# Patient Record
Sex: FEMALE | Race: WHITE | NOT HISPANIC OR LATINO | Employment: OTHER | URBAN - NONMETROPOLITAN AREA
[De-identification: names, ages, dates, MRNs, and addresses within clinical notes are randomized per-mention and may not be internally consistent; named-entity substitution may affect disease eponyms.]

---

## 2017-09-07 ENCOUNTER — TRANSCRIBE ORDERS (OUTPATIENT)
Dept: ADMINISTRATIVE | Facility: HOSPITAL | Age: 67
End: 2017-09-07

## 2017-09-07 DIAGNOSIS — Z13.820 SCREENING FOR OSTEOPOROSIS: Primary | ICD-10-CM

## 2017-09-07 DIAGNOSIS — Z12.31 VISIT FOR SCREENING MAMMOGRAM: ICD-10-CM

## 2017-09-25 ENCOUNTER — HOSPITAL ENCOUNTER (OUTPATIENT)
Dept: MAMMOGRAPHY | Facility: HOSPITAL | Age: 67
Discharge: HOME | End: 2017-09-25
Admitting: NURSE PRACTITIONER

## 2017-09-25 ENCOUNTER — HOSPITAL ENCOUNTER (OUTPATIENT)
Dept: BONE DENSITY | Facility: HOSPITAL | Age: 67
Discharge: HOME | End: 2017-09-25

## 2017-09-25 DIAGNOSIS — Z13.820 SCREENING FOR OSTEOPOROSIS: ICD-10-CM

## 2017-09-25 DIAGNOSIS — Z12.31 VISIT FOR SCREENING MAMMOGRAM: ICD-10-CM

## 2017-09-25 PROCEDURE — 77080 DXA BONE DENSITY AXIAL: CPT | Performed by: RADIOLOGY

## 2017-09-25 PROCEDURE — G0202 SCR MAMMO BI INCL CAD: HCPCS

## 2017-09-25 PROCEDURE — 77080 DXA BONE DENSITY AXIAL: CPT

## 2017-09-25 PROCEDURE — 77063 BREAST TOMOSYNTHESIS BI: CPT

## 2017-09-25 PROCEDURE — 77063 BREAST TOMOSYNTHESIS BI: CPT | Performed by: RADIOLOGY

## 2017-09-25 PROCEDURE — G0202 SCR MAMMO BI INCL CAD: HCPCS | Performed by: RADIOLOGY

## 2018-10-05 ENCOUNTER — HOSPITAL ENCOUNTER (OUTPATIENT)
Dept: MAMMOGRAPHY | Facility: HOSPITAL | Age: 68
Discharge: HOME | End: 2018-10-05
Admitting: FAMILY MEDICINE

## 2018-10-05 DIAGNOSIS — Z12.39 SCREENING BREAST EXAMINATION: ICD-10-CM

## 2018-10-05 PROCEDURE — 77067 SCR MAMMO BI INCL CAD: CPT | Performed by: RADIOLOGY

## 2018-10-05 PROCEDURE — 77067 SCR MAMMO BI INCL CAD: CPT

## 2018-10-05 PROCEDURE — 77063 BREAST TOMOSYNTHESIS BI: CPT | Performed by: RADIOLOGY

## 2018-10-05 PROCEDURE — 77063 BREAST TOMOSYNTHESIS BI: CPT

## 2019-10-08 ENCOUNTER — HOSPITAL ENCOUNTER (OUTPATIENT)
Dept: MAMMOGRAPHY | Facility: HOSPITAL | Age: 69
Discharge: HOME | End: 2019-10-08
Admitting: CLINIC/CENTER

## 2019-10-08 DIAGNOSIS — Z12.31 VISIT FOR SCREENING MAMMOGRAM: ICD-10-CM

## 2019-10-08 PROCEDURE — 77063 BREAST TOMOSYNTHESIS BI: CPT

## 2019-10-08 PROCEDURE — 77067 SCR MAMMO BI INCL CAD: CPT | Performed by: RADIOLOGY

## 2019-10-08 PROCEDURE — 77067 SCR MAMMO BI INCL CAD: CPT

## 2019-10-08 PROCEDURE — 77063 BREAST TOMOSYNTHESIS BI: CPT | Performed by: RADIOLOGY

## 2020-10-22 ENCOUNTER — OFFICE VISIT (OUTPATIENT)
Dept: CARDIOLOGY | Facility: CLINIC | Age: 70
End: 2020-10-22

## 2020-10-22 VITALS
DIASTOLIC BLOOD PRESSURE: 80 MMHG | SYSTOLIC BLOOD PRESSURE: 145 MMHG | HEIGHT: 64 IN | WEIGHT: 218.2 LBS | HEART RATE: 52 BPM | BODY MASS INDEX: 37.25 KG/M2 | OXYGEN SATURATION: 95 %

## 2020-10-22 DIAGNOSIS — R00.1 BRADYCARDIA, SINUS: ICD-10-CM

## 2020-10-22 DIAGNOSIS — R60.0 LOWER EXTREMITY EDEMA: ICD-10-CM

## 2020-10-22 DIAGNOSIS — R42 DIZZINESS: ICD-10-CM

## 2020-10-22 DIAGNOSIS — R06.02 SHORTNESS OF BREATH: Primary | ICD-10-CM

## 2020-10-22 PROBLEM — R00.2 PALPITATIONS: Status: ACTIVE | Noted: 2020-10-22

## 2020-10-22 PROCEDURE — 99204 OFFICE O/P NEW MOD 45 MIN: CPT | Performed by: PHYSICIAN ASSISTANT

## 2020-10-22 RX ORDER — LOSARTAN POTASSIUM 25 MG/1
25 TABLET ORAL DAILY
COMMUNITY

## 2020-10-22 RX ORDER — LEVOTHYROXINE SODIUM 0.12 MG/1
125 TABLET ORAL DAILY
COMMUNITY

## 2020-10-22 RX ORDER — ASPIRIN 81 MG/1
81 TABLET ORAL DAILY
COMMUNITY

## 2020-10-22 RX ORDER — CEPHALEXIN 500 MG/1
500 CAPSULE ORAL 3 TIMES DAILY
COMMUNITY

## 2020-10-22 NOTE — PROGRESS NOTES
Subjective   Milly Chase is a 70 y.o. female     Chief Complaint   Patient presents with   • Establish Care       HPI    Patient is a 70-year-old female the presents to the office for evaluation.  Patient has been referred because of bradycardia.  She was scheduled to undergo hysterectomy.  Because of her EKG showing bradycardia with a heart rate of near 52 bpm, was recommended to have cardiac ablation prior    She is apparently seen a cardiologist in the past.  In the distant past greater than 15 years, she had a cardiac catheterization apparently.  No stenting procedures performed    She does well but she has no chest pain or pressure.  She has mild levels of stable dyspnea.  No increased dyspnea.  She can walk up a flight of stairs she can walk up a hill and does well.  She has no chest pain and only very minimal dyspnea.  She does not describe any palpitations.  She does have occasional dizziness.  This occurs at random.  She does not describe presyncope or syncope.    Otherwise she is doing well      Current Outpatient Medications   Medication Sig Dispense Refill   • aspirin 81 MG EC tablet Take 81 mg by mouth Daily. Pt states she doesn't take it regularly     • cephalexin (KEFLEX) 500 MG capsule Take 500 mg by mouth 3 (Three) Times a Day.     • levothyroxine (SYNTHROID, LEVOTHROID) 125 MCG tablet Take 125 mcg by mouth Daily.     • losartan (COZAAR) 25 MG tablet Take 25 mg by mouth Daily.     • OMEPRAZOLE PO Take  by mouth.       No current facility-administered medications for this visit.        Patient has no known allergies.    Past Medical History:   Diagnosis Date   • Diabetes (CMS/HCC)    • GERD (gastroesophageal reflux disease)    • Hashimoto's disease    • Hypertension    • Hypothyroidism        Social History     Socioeconomic History   • Marital status:      Spouse name: Not on file   • Number of children: Not on file   • Years of education: Not on file   • Highest education level: Not on  "file   Tobacco Use   • Smoking status: Former Smoker     Packs/day: 0.75     Years: 6.00     Pack years: 4.50     Types: Cigarettes   • Smokeless tobacco: Never Used   Substance and Sexual Activity   • Alcohol use: Not Currently   • Drug use: Never   • Sexual activity: Defer       Family History   Problem Relation Age of Onset   • Breast cancer Paternal Aunt    • Hernia Mother    • Hypertension Mother    • Heart attack Father    • Hypertension Father    • Hyperlipidemia Father        Review of Systems   Constitutional: Positive for fatigue.   Respiratory: Positive for shortness of breath (w/ increased activity ). Negative for chest tightness.    Cardiovascular: Positive for leg swelling (LLE edema ). Negative for chest pain and palpitations.   Endocrine: Negative.    Musculoskeletal: Positive for arthralgias and neck pain. Negative for back pain.   Skin: Negative.    Allergic/Immunologic: Positive for environmental allergies (Seasonal ). Negative for food allergies.   Neurological: Positive for weakness (Left leg and left arm ), numbness (Left leg and left arm- usually at HS ) and headaches (Seldom ). Negative for dizziness, syncope and light-headedness.   Hematological: Bruises/bleeds easily.   Psychiatric/Behavioral: Positive for sleep disturbance (States she doesn't sleep well, son states she has SoA and stops breathing- never tested for VANESSA).   All other systems reviewed and are negative.      Objective   Vitals:    10/22/20 0937   BP: 145/80   Pulse: 52   SpO2: 95%   Weight: 99 kg (218 lb 3.2 oz)   Height: 162.6 cm (64\")      /80   Pulse 52   Ht 162.6 cm (64\")   Wt 99 kg (218 lb 3.2 oz)   SpO2 95%   BMI 37.45 kg/m²     Lab Results (most recent)     None          Physical Exam  Vitals signs and nursing note reviewed.   Constitutional:       General: She is not in acute distress.     Appearance: Normal appearance. She is well-developed.   HENT:      Head: Normocephalic and atraumatic.   Eyes:      " General: No scleral icterus.        Right eye: No discharge.         Left eye: No discharge.      Conjunctiva/sclera: Conjunctivae normal.   Neck:      Vascular: No carotid bruit.   Cardiovascular:      Rate and Rhythm: Normal rate and regular rhythm.      Heart sounds: Normal heart sounds. No murmur. No friction rub. No gallop.    Pulmonary:      Effort: Pulmonary effort is normal. No respiratory distress.      Breath sounds: Normal breath sounds. No wheezing or rales.   Chest:      Chest wall: No tenderness.   Musculoskeletal:      Right lower leg: Edema present.      Left lower leg: Edema present.   Skin:     General: Skin is warm and dry.      Coloration: Skin is not pale.      Findings: No erythema or rash.   Neurological:      Mental Status: She is alert and oriented to person, place, and time.      Cranial Nerves: No cranial nerve deficit.   Psychiatric:         Behavior: Behavior normal.         Procedure   Procedures         Assessment/Plan     Problems Addressed this Visit        Cardiovascular and Mediastinum    Bradycardia, sinus    Relevant Orders    Adult Transthoracic Echo Complete W/ Cont if Necessary Per Protocol    Cardiac Event Monitor       Respiratory    Shortness of breath - Primary    Relevant Orders    Adult Transthoracic Echo Complete W/ Cont if Necessary Per Protocol    Cardiac Event Monitor       Other    Dizziness    Relevant Orders    Adult Transthoracic Echo Complete W/ Cont if Necessary Per Protocol    Cardiac Event Monitor    Lower extremity edema    Relevant Orders    Adult Transthoracic Echo Complete W/ Cont if Necessary Per Protocol    Cardiac Event Monitor      Diagnoses       Codes Comments    Shortness of breath    -  Primary ICD-10-CM: R06.02  ICD-9-CM: 786.05     Bradycardia, sinus     ICD-10-CM: R00.1  ICD-9-CM: 427.89     Dizziness     ICD-10-CM: R42  ICD-9-CM: 780.4     Lower extremity edema     ICD-10-CM: R60.0  ICD-9-CM: 782.3           Recommendation  1.  Patient with  complaints of dizziness on occasion.  She has bradycardia although heart rate is at 52 bpm.  I would like to schedule event monitor to evaluate further and ensure no bradycardia arrhythmia    2.  Patient describes waking up at night.  She does snore and apparently was told that she quits breathing at night.  I would recommend that she get referral to a pulmonary specialist or have some type of sleep study performed to evaluate    3.  Patient with lower extremity edema which can be bilateral but mainly in the left lower extremity.  She has no calf tenderness.  Because of her edema and shortness of breath, we will schedule echo to ensure normal cardiac structure and function    4.  Otherwise, her functional status is great.  She could perform class II levels of activity without symptoms.  If echo is normal and event monitor shows no significant arrhythmias, we will consider her acceptable risk for surgery.  We will see her back for follow-up on testing.  She is to follow with primary as scheduled             Milly Chase  reports that she has quit smoking. Her smoking use included cigarettes. She has a 4.50 pack-year smoking history. She has never used smokeless tobacco.     Patient's Body mass index is 37.45 kg/m². BMI is above normal parameters. Recommendations include: educational material.       Electronically signed by:

## 2020-10-22 NOTE — PATIENT INSTRUCTIONS
How to Quarantine at Home  Information for Patients and Families    These instructions are for people with confirmed or suspected COVID-19 who do not need to be hospitalized and those with confirmed COVID-19 who were hospitalized and discharged to care for themselves at home.    If you were tested through the Health Department  The Health Department will monitor your wellbeing.  If it is determined that you do not need to be hospitalized and can be isolated at home, you will be monitored by staff from your local or state health department.     If you were tested through a Commercial [facility]  You will need to monitor yourself and report changes in your symptoms to your doctor.  See the section below called Monitor Your Symptoms.    Follow these steps until a healthcare provider or local or state health department says you can return to your normal activities.    Stay home except to get medical care  • Restrict activities outside your home, except for getting medical care.   • Do not go to work, school, or public areas.   • Avoid using public transportation, ride-sharing, or taxis.    Separate yourself from other people and animals in your home  People  As much as possible, you should stay in a specific room and away from other people in your home. Also, you should use a separate bathroom, if available.    Animals  You should restrict contact with pets and other animals while you are sick with COVID-19, just like you would around other people. When possible, have another member of your household care for your animals while you are sick. If you are sick with COVID-19, avoid contact with your pet, including petting, snuggling, being kissed or licked, and sharing food. If you must care for your pet or be around animals while you are sick, wash your hands before and after you interact with pets and wear a facemask. See COVID-19 and Animals for more information.    Call ahead before visiting your doctor  If you have a medical  appointment, call the healthcare provider and tell them that you have or may have COVID-19. This information will help the healthcare provider’s office take steps to keep other people from getting infected or exposed.    Wear a facemask  You should wear a facemask when you are around other people (e.g., sharing a room or vehicle) or pets and before you enter a healthcare provider’s office.     If you are not able to wear a facemask (for example, because it causes trouble breathing), then people who live with you should not stay in the same room with you, or they should wear a facemask if they enter your room.    Cover your coughs and sneezes  • Cover your mouth and nose with a tissue when you cough or sneeze.   • Throw used tissues in a lined trash can.   • Immediately wash your hands with soap and water for at least 20 seconds or, if soap and water are not available, clean your hands with an alcohol-based hand  that contains at least 60% alcohol.    Clean your hands often  • Wash your hands often with soap and water for at least 20 seconds, especially after blowing your nose, coughing, or sneezing; going to the bathroom; and before eating or preparing food.     • If soap and water are not readily available, use an alcohol-based hand  with at least 60% alcohol, covering all surfaces of your hands and rubbing them together until they feel dry.    • Soap and water are the best option if hands are visibly dirty. Avoid touching your eyes, nose, and mouth with unwashed hands.    Avoid sharing personal household items  • You should not share dishes, drinking glasses, cups, eating utensils, towels, or bedding with other people or pets in your home.   • After using these items, they should be washed thoroughly with soap and water.    Clean all “high-touch” surfaces everyday  • High touch surfaces include counters, tabletops, doorknobs, bathroom fixtures, toilets, phones, keyboards, tablets, and bedside  tables.   • Also, clean any surfaces that may have blood, stool, or body fluids on them.   • Use a household cleaning spray or wipe, according to the label instructions. Labels contain instructions for safe and effective use of the cleaning product, including precautions you should take when applying the product, such as wearing gloves and making sure you have good ventilation during use of the product.    Monitor your symptoms  • Seek prompt medical attention if your illness is worsening (e.g., difficulty breathing).   • Before seeking care, call your healthcare provider and tell them that you have, or are being evaluated for, COVID-19.   • Put on a facemask before you enter the facility.     • These steps will help the healthcare provider’s office to keep other people in the office or waiting room from getting infected or exposed.   • Persons who are placed under active monitoring or facilitated self-monitoring should follow instructions provided by their local health department or occupational health professionals, as appropriate.  • If you have a medical emergency and need to call 911, notify the dispatch personnel that you have, or are being evaluated for COVID-19. If possible, put on a facemask before emergency medical services arrive.    Discontinuing home isolation  Patients with confirmed COVID-19 should remain under home isolation precautions until the risk of secondary transmission to others is thought to be low. The decision to discontinue home isolation precautions should be made on a case-by-case basis, in consultation with healthcare providers and state and local health departments.    The below content are for household members, intimate partners, and caregivers of a patient with symptomatic laboratory-confirmed COVID-19 or a patient under investigation:    Household members, intimate partners, and caregivers may have close contact with a person with symptomatic, laboratory-confirmed COVID-19 or a  person under investigation.     Close contacts should monitor their health; they should call their healthcare provider right away if they develop symptoms suggestive of COVID-19 (e.g., fever, cough, shortness of breath)     Close contacts should also follow these recommendations:  • Make sure that you understand and can help the patient follow their healthcare provider’s instructions for medication(s) and care. You should help the patient with basic needs in the home and provide support for getting groceries, prescriptions, and other personal needs.  • Monitor the patient’s symptoms. If the patient is getting sicker, call his or her healthcare provider and tell them that the patient has laboratory-confirmed COVID-19. This will help the healthcare provider’s office take steps to keep other people in the office or waiting room from getting infected. Ask the healthcare provider to call the local or [facility] health department for additional guidance. If the patient has a medical emergency and you need to call 911, notify the dispatch personnel that the patient has, or is being evaluated for COVID-19.  • Household members should stay in another room or be  from the patient as much as possible. Household members should use a separate bedroom and bathroom, if available.  • Prohibit visitors who do not have an essential need to be in the home.  • Household members should care for any pets in the home. Do not handle pets or other animals while sick.  For more information, see COVID-19 and Animals.  • Make sure that shared spaces in the home have good air flow, such as by an air conditioner or an opened window, weather permitting.  • Perform hand hygiene frequently. Wash your hands often with soap and water for at least 20 seconds or use an alcohol-based hand  that contains 60 to 95% alcohol, covering all surfaces of your hands and rubbing them together until they feel dry. Soap and water should be used  preferentially if hands are visibly dirty.  • Avoid touching your eyes, nose, and mouth with unwashed hands.  • The patient should wear a facemask when you are around other people. If the patient is not able to wear a facemask (for example, because it causes trouble breathing), you, as the caregiver, should wear a mask when you are in the same room as the patient.  • Wear a disposable facemask and gloves when you touch or have contact with the patient’s blood, stool, or body fluids, such as saliva, sputum, nasal mucus, vomit, or urine.   o Throw out disposable facemasks and gloves after using them. Do not reuse.  o When removing personal protective equipment, first remove and dispose of gloves. Then, immediately clean your hands with soap and water or alcohol-based hand . Next, remove and dispose of facemask, and immediately clean your hands again with soap and water or alcohol-based hand .  • Avoid sharing household items with the patient. You should not share dishes, drinking glasses, cups, eating utensils, towels, bedding, or other items. After the patient uses these items, you should wash them thoroughly (see below “Wash laundry thoroughly”).  • Clean all “high-touch” surfaces, such as counters, tabletops, doorknobs, bathroom fixtures, toilets, phones, keyboards, tablets, and bedside tables, every day. Also, clean any surfaces that may have blood, stool, or body fluids on them.   o Use a household cleaning spray or wipe, according to the label instructions. Labels contain instructions for safe and effective use of the cleaning product including precautions you should take when applying the product, such as wearing gloves and making sure you have good ventilation during use of the product.  • Wash laundry thoroughly.   o Immediately remove and wash clothes or bedding that have blood, stool, or body fluids on them.  o Wear disposable gloves while handling soiled items and keep soiled items away  from your body. Clean your hands (with soap and water or an alcohol-based hand ) immediately after removing your gloves.  o Read and follow directions on labels of laundry or clothing items and detergent. In general, using a normal laundry detergent according to washing machine instructions and dry thoroughly using the warmest temperatures recommended on the clothing label.  • Place all used disposable gloves, facemasks, and other contaminated items in a lined container before disposing of them with other household waste. Clean your hands (with soap and water or an alcohol-based hand ) immediately after handling these items. Soap and water should be used preferentially if hands are visibly dirty.  • Discuss any additional questions with your state or local health department or healthcare provider.    Adapted from information provided by the Centers for Disease Control and Prevention.  For more information, visit https://www.cdc.gov/coronavirus/2019-ncov/hcp/guidance-prevent-spread.htmlFat and Cholesterol Restricted Eating Plan  Getting too much fat and cholesterol in your diet may cause health problems. Choosing the right foods helps keep your fat and cholesterol at normal levels. This can keep you from getting certain diseases.  Your doctor may recommend an eating plan that includes:  · Total fat: ______% or less of total calories a day.  · Saturated fat: ______% or less of total calories a day.  · Cholesterol: less than _________mg a day.  · Fiber: ______g a day.  What are tips for following this plan?  Meal planning  · At meals, divide your plate into four equal parts:  ? Fill one-half of your plate with vegetables and green salads.  ? Fill one-fourth of your plate with whole grains.  ? Fill one-fourth of your plate with low-fat (lean) protein foods.  · Eat fish that is high in omega-3 fats at least two times a week. This includes mackerel, tuna, sardines, and salmon.  · Eat foods that are high  "in fiber, such as whole grains, beans, apples, broccoli, carrots, peas, and barley.  General tips    · Work with your doctor to lose weight if you need to.  · Avoid:  ? Foods with added sugar.  ? Fried foods.  ? Foods with partially hydrogenated oils.  · Limit alcohol intake to no more than 1 drink a day for nonpregnant women and 2 drinks a day for men. One drink equals 12 oz of beer, 5 oz of wine, or 1½ oz of hard liquor.  Reading food labels  · Check food labels for:  ? Trans fats.  ? Partially hydrogenated oils.  ? Saturated fat (g) in each serving.  ? Cholesterol (mg) in each serving.  ? Fiber (g) in each serving.  · Choose foods with healthy fats, such as:  ? Monounsaturated fats.  ? Polyunsaturated fats.  ? Omega-3 fats.  · Choose grain products that have whole grains. Look for the word \"whole\" as the first word in the ingredient list.  Cooking  · Cook foods using low-fat methods. These include baking, boiling, grilling, and broiling.  · Eat more home-cooked foods. Eat at restaurants and buffets less often.  · Avoid cooking using saturated fats, such as butter, cream, palm oil, palm kernel oil, and coconut oil.  Recommended foods    Fruits  · All fresh, canned (in natural juice), or frozen fruits.  Vegetables  · Fresh or frozen vegetables (raw, steamed, roasted, or grilled). Green salads.  Grains  · Whole grains, such as whole wheat or whole grain breads, crackers, cereals, and pasta. Unsweetened oatmeal, bulgur, barley, quinoa, or brown rice. Corn or whole wheat flour tortillas.  Meats and other protein foods  · Ground beef (85% or leaner), grass-fed beef, or beef trimmed of fat. Skinless chicken or turkey. Ground chicken or turkey. Pork trimmed of fat. All fish and seafood. Egg whites. Dried beans, peas, or lentils. Unsalted nuts or seeds. Unsalted canned beans. Nut butters without added sugar or oil.  Dairy  · Low-fat or nonfat dairy products, such as skim or 1% milk, 2% or reduced-fat cheeses, low-fat and " fat-free ricotta or cottage cheese, or plain low-fat and nonfat yogurt.  Fats and oils  · Tub margarine without trans fats. Light or reduced-fat mayonnaise and salad dressings. Avocado. Olive, canola, sesame, or safflower oils.  The items listed above may not be a complete list of foods and beverages you can eat. Contact a dietitian for more information.  Foods to avoid  Fruits  · Canned fruit in heavy syrup. Fruit in cream or butter sauce. Fried fruit.  Vegetables  · Vegetables cooked in cheese, cream, or butter sauce. Fried vegetables.  Grains  · White bread. White pasta. White rice. Cornbread. Bagels, pastries, and croissants. Crackers and snack foods that contain trans fat and hydrogenated oils.  Meats and other protein foods  · Fatty cuts of meat. Ribs, chicken wings, velasco, sausage, bologna, salami, chitterlings, fatback, hot dogs, bratwurst, and packaged lunch meats. Liver and organ meats. Whole eggs and egg yolks. Chicken and turkey with skin. Fried meat.  Dairy  · Whole or 2% milk, cream, half-and-half, and cream cheese. Whole milk cheeses. Whole-fat or sweetened yogurt. Full-fat cheeses. Nondairy creamers and whipped toppings. Processed cheese, cheese spreads, and cheese curds.  Beverages  · Alcohol. Sugar-sweetened drinks such as sodas, lemonade, and fruit drinks.  Fats and oils  · Butter, stick margarine, lard, shortening, ghee, or velasco fat. Coconut, palm kernel, and palm oils.  Sweets and desserts  · Corn syrup, sugars, honey, and molasses. Candy. Jam and jelly. Syrup. Sweetened cereals. Cookies, pies, cakes, donuts, muffins, and ice cream.  The items listed above may not be a complete list of foods and beverages you should avoid. Contact a dietitian for more information.  Summary  · Choosing the right foods helps keep your fat and cholesterol at normal levels. This can keep you from getting certain diseases.  · At meals, fill one-half of your plate with vegetables and green salads.  · Eat high-fiber  foods, like whole grains, beans, apples, carrots, peas, and barley.  · Limit added sugar, saturated fats, alcohol, and fried foods.  This information is not intended to replace advice given to you by your health care provider. Make sure you discuss any questions you have with your health care provider.  Document Released: 06/18/2013 Document Revised: 08/21/2019 Document Reviewed: 09/04/2018  [employer] Patient Education © 2020 [employer]  BMI for Adults  What is BMI?  Body mass index (BMI) is a number that is calculated from a person's weight and height. BMI can help estimate how much of a person's weight is composed of fat. BMI does not measure body fat directly. Rather, it is an alternative to procedures that directly measure body fat, which can be difficult and expensive.  BMI can help identify people who may be at higher risk for certain medical problems.  What are BMI measurements used for?  BMI is used as a screening tool to identify possible weight problems. It helps determine whether a person is obese, overweight, a healthy weight, or underweight.  BMI is useful for:  · Identifying a weight problem that may be related to a medical condition or may increase the risk for medical problems.  · Promoting changes, such as changes in diet and exercise, to help reach a healthy weight. BMI screening can be repeated to see if these changes are working.  How is BMI calculated?  BMI involves measuring your weight in relation to your height. Both height and weight are measured, and the BMI is calculated from those numbers. This can be done either in English ([place] or metric measurements. Note that charts and online BMI calculators are available to help you find your BMI quickly and easily without having to do these calculations yourself.  To calculate your BMI in English ([place] measurements:    1. Measure your weight in pounds (lb).  2. Multiply the number of pounds by 703.  ? For example, for a person who weighs 180  "lb, multiply that number by 703, which equals 126,540.  3. Measure your height in inches. Then multiply that number by itself to get a measurement called \"inches squared.\"  ? For example, for a person who is 70 inches tall, the \"inches squared\" measurement is 70 inches x 70 inches, which equals 4,900 inches squared.  4. Divide the total from step 2 (number of lb x 703) by the total from step 3 (inches squared): 126,540 ÷ 4,900 = 25.8. This is your BMI.  To calculate your BMI in metric measurements:  1. Measure your weight in kilograms (kg).  2. Measure your height in meters (m). Then multiply that number by itself to get a measurement called \"meters squared.\"  ? For example, for a person who is 1.75 m tall, the \"meters squared\" measurement is 1.75 m x 1.75 m, which is equal to 3.1 meters squared.  3. Divide the number of kilograms (your weight) by the meters squared number. In this example: 70 ÷ 3.1 = 22.6. This is your BMI.  What do the results mean?  BMI charts are used to identify whether you are underweight, normal weight, overweight, or obese. The following guidelines will be used:  · Underweight: BMI less than 18.5.  · Normal weight: BMI between 18.5 and 24.9.  · Overweight: BMI between 25 and 29.9.  · Obese: BMI of 30 or above.  Keep these notes in mind:  · Weight includes both fat and muscle, so someone with a muscular build, such as an athlete, may have a BMI that is higher than 24.9. In cases like these, BMI is not an accurate measure of body fat.  · To determine if excess body fat is the cause of a BMI of 25 or higher, further assessments may need to be done by a health care provider.  · BMI is usually interpreted in the same way for men and women.  Where to find more information  For more information about BMI, including tools to quickly calculate your BMI, go to these websites:  · Centers for Disease Control and Prevention: www.cdc.gov  · American Heart Association: www.heart.org  · National Heart, " Lung, and Blood [place]: www.nhlbi.nih.gov  Summary  · Body mass index (BMI) is a number that is calculated from a person's weight and height.  · BMI may help estimate how much of a person's weight is composed of fat. BMI can help identify those who may be at higher risk for certain medical problems.  · BMI can be measured using English measurements or metric measurements.  · BMI charts are used to identify whether you are underweight, normal weight, overweight, or obese.  This information is not intended to replace advice given to you by your health care provider. Make sure you discuss any questions you have with your health care provider.  Document Released: 08/29/2005 Document Revised: 09/09/2020 Document Reviewed: 07/17/2020  [employer] Patient Education © 2020 [employer]    Advance Care Planning and Advance Directives     You make decisions on a daily basis - decisions about where you want to live, your career, your home, your life. Perhaps one of the most important decisions you face is your choice for future medical care. Take time to talk with your family and your healthcare team and start planning today.  Advance Care Planning is a process that can help you:  · Understand possible future healthcare decisions in light of your own experiences  · Reflect on those decision in light of your goals and values  · Discuss your decisions with those closest to you and the healthcare professionals that care for you  · Make a plan by creating a document that reflects your wishes    Surrogate Decision Maker  In the event of a medical emergency, which has left you unable to communicate or to make your own decisions, you would need someone to make decisions for you.  It is important to discuss your preferences for medical treatment with this person while you are in good health.     Qualities of a surrogate decision maker:  • Willing to take on this role and responsibility  • Knows what you want for future medical  care  • Willing to follow your wishes even if they don't agree with them  • Able to make difficult medical decisions under stressful circumstances    Advance Directives  These are legal documents you can create that will guide your healthcare team and decision maker(s) when needed. These documents can be stored in the electronic medical record.    · Living Will - a legal document to guide your care if you have a terminal condition or a serious illness and are unable to communicate. States vary by statute in document names/types, but most forms may include one or more of the following:        -  Directions regarding life-prolonging treatments        -  Directions regarding artificially provided nutrition/hydration        -  Choosing a healthcare decision maker        -  Direction regarding organ/tissue donation    · Durable Power of  for Healthcare - this document names an -in-fact to make medical decisions for you, but it may also allow this person to make personal and financial decisions for you. Please seek the advice of an  if you need this type of document.    **Advance Directives are not required and no one may discriminate against you if you do not sign one.    Medical Orders  Many states allow specific forms/orders signed by your physician to record your wishes for medical treatment in your current state of health. This form, signed in personal communication with your physician, addresses resuscitation and other medical interventions that you may or may not want.      For more information or to schedule a time with a [facility] Advance Care Planning Facilitator contact: [email or web address] or call [phone] and someone will contact you directly.

## 2020-11-16 ENCOUNTER — OUTSIDE FACILITY SERVICE (OUTPATIENT)
Dept: CARDIOLOGY | Facility: CLINIC | Age: 70
End: 2020-11-16

## 2020-11-16 PROCEDURE — 93228 REMOTE 30 DAY ECG REV/REPORT: CPT | Performed by: INTERNAL MEDICINE

## 2020-11-23 ENCOUNTER — APPOINTMENT (OUTPATIENT)
Dept: MAMMOGRAPHY | Facility: HOSPITAL | Age: 70
End: 2020-11-23

## 2020-11-30 ENCOUNTER — APPOINTMENT (OUTPATIENT)
Dept: MAMMOGRAPHY | Facility: HOSPITAL | Age: 70
End: 2020-11-30

## 2020-11-30 ENCOUNTER — HOSPITAL ENCOUNTER (OUTPATIENT)
Dept: CARDIOLOGY | Facility: HOSPITAL | Age: 70
Discharge: HOME | End: 2020-11-30
Admitting: PHYSICIAN ASSISTANT

## 2020-11-30 DIAGNOSIS — R42 DIZZINESS: ICD-10-CM

## 2020-11-30 DIAGNOSIS — R06.02 SHORTNESS OF BREATH: ICD-10-CM

## 2020-11-30 DIAGNOSIS — R60.0 LOWER EXTREMITY EDEMA: ICD-10-CM

## 2020-11-30 DIAGNOSIS — R00.1 BRADYCARDIA, SINUS: ICD-10-CM

## 2020-11-30 PROCEDURE — 93356 MYOCRD STRAIN IMG SPCKL TRCK: CPT

## 2020-11-30 PROCEDURE — 93356 MYOCRD STRAIN IMG SPCKL TRCK: CPT | Performed by: INTERNAL MEDICINE

## 2020-11-30 PROCEDURE — 93306 TTE W/DOPPLER COMPLETE: CPT | Performed by: INTERNAL MEDICINE

## 2020-11-30 PROCEDURE — 93306 TTE W/DOPPLER COMPLETE: CPT

## 2020-12-01 LAB
BH CV ECHO MEAS - ACS: 2.1 CM
BH CV ECHO MEAS - AO MAX PG: 5.9 MMHG
BH CV ECHO MEAS - AO MEAN PG: 3 MMHG
BH CV ECHO MEAS - AO ROOT AREA (BSA CORRECTED): 1.6
BH CV ECHO MEAS - AO ROOT AREA: 8.6 CM^2
BH CV ECHO MEAS - AO ROOT DIAM: 3.3 CM
BH CV ECHO MEAS - AO V2 MAX: 121 CM/SEC
BH CV ECHO MEAS - AO V2 MEAN: 83.2 CM/SEC
BH CV ECHO MEAS - AO V2 VTI: 32.5 CM
BH CV ECHO MEAS - BSA(HAYCOCK): 2.2 M^2
BH CV ECHO MEAS - BSA: 2 M^2
BH CV ECHO MEAS - BZI_BMI: 37.4 KILOGRAMS/M^2
BH CV ECHO MEAS - BZI_METRIC_HEIGHT: 162.6 CM
BH CV ECHO MEAS - BZI_METRIC_WEIGHT: 98.9 KG
BH CV ECHO MEAS - EDV(CUBED): 42.5 ML
BH CV ECHO MEAS - EDV(MOD-SP4): 52.3 ML
BH CV ECHO MEAS - EDV(TEICH): 50.5 ML
BH CV ECHO MEAS - EF(CUBED): 65.8 %
BH CV ECHO MEAS - EF(MOD-SP4): 36.9 %
BH CV ECHO MEAS - EF(TEICH): 58.4 %
BH CV ECHO MEAS - ESV(CUBED): 14.5 ML
BH CV ECHO MEAS - ESV(MOD-SP4): 33 ML
BH CV ECHO MEAS - ESV(TEICH): 21 ML
BH CV ECHO MEAS - FS: 30.1 %
BH CV ECHO MEAS - IVS/LVPW: 0.93
BH CV ECHO MEAS - IVSD: 1.4 CM
BH CV ECHO MEAS - LA DIMENSION: 3.1 CM
BH CV ECHO MEAS - LA/AO: 0.94
BH CV ECHO MEAS - LV DIASTOLIC VOL/BSA (35-75): 25.8 ML/M^2
BH CV ECHO MEAS - LV IVRT: 0.11 SEC
BH CV ECHO MEAS - LV MASS(C)D: 186.4 GRAMS
BH CV ECHO MEAS - LV MASS(C)DI: 91.9 GRAMS/M^2
BH CV ECHO MEAS - LV SYSTOLIC VOL/BSA (12-30): 16.3 ML/M^2
BH CV ECHO MEAS - LVIDD: 3.5 CM
BH CV ECHO MEAS - LVIDS: 2.4 CM
BH CV ECHO MEAS - LVLD AP4: 7.4 CM
BH CV ECHO MEAS - LVLS AP4: 5.6 CM
BH CV ECHO MEAS - LVOT AREA (M): 3.5 CM^2
BH CV ECHO MEAS - LVOT AREA: 3.5 CM^2
BH CV ECHO MEAS - LVOT DIAM: 2.1 CM
BH CV ECHO MEAS - LVPWD: 1.5 CM
BH CV ECHO MEAS - MV A MAX VEL: 83.1 CM/SEC
BH CV ECHO MEAS - MV DEC SLOPE: 337 CM/SEC^2
BH CV ECHO MEAS - MV E MAX VEL: 69.8 CM/SEC
BH CV ECHO MEAS - MV E/A: 0.84
BH CV ECHO MEAS - RAP SYSTOLE: 10 MMHG
BH CV ECHO MEAS - RVDD: 3 CM
BH CV ECHO MEAS - RVSP: 33.4 MMHG
BH CV ECHO MEAS - SI(AO): 137 ML/M^2
BH CV ECHO MEAS - SI(CUBED): 13.8 ML/M^2
BH CV ECHO MEAS - SI(MOD-SP4): 9.5 ML/M^2
BH CV ECHO MEAS - SI(TEICH): 14.5 ML/M^2
BH CV ECHO MEAS - SV(AO): 278 ML
BH CV ECHO MEAS - SV(CUBED): 28 ML
BH CV ECHO MEAS - SV(MOD-SP4): 19.3 ML
BH CV ECHO MEAS - SV(TEICH): 29.5 ML
BH CV ECHO MEAS - TR MAX VEL: 242 CM/SEC
MAXIMAL PREDICTED HEART RATE: 148 BPM
STRESS TARGET HR: 126 BPM

## 2020-12-07 ENCOUNTER — TELEPHONE (OUTPATIENT)
Dept: CARDIOLOGY | Facility: CLINIC | Age: 70
End: 2020-12-07

## 2020-12-07 NOTE — TELEPHONE ENCOUNTER
Unable to lvm for patient re: echo. Keep routine fu. CANDE PATEL.       ----- Message from OSVALDO San sent at 12/3/2020 12:56 PM EST -----  Routine follow-up

## 2020-12-14 ENCOUNTER — OFFICE VISIT (OUTPATIENT)
Dept: CARDIOLOGY | Facility: CLINIC | Age: 70
End: 2020-12-14

## 2020-12-14 VITALS
HEART RATE: 55 BPM | BODY MASS INDEX: 37.44 KG/M2 | HEIGHT: 64 IN | DIASTOLIC BLOOD PRESSURE: 77 MMHG | OXYGEN SATURATION: 96 % | SYSTOLIC BLOOD PRESSURE: 149 MMHG

## 2020-12-14 DIAGNOSIS — R60.0 LOWER EXTREMITY EDEMA: ICD-10-CM

## 2020-12-14 DIAGNOSIS — I36.1 NONRHEUMATIC TRICUSPID VALVE REGURGITATION: Primary | ICD-10-CM

## 2020-12-14 DIAGNOSIS — R00.1 BRADYCARDIA, SINUS: ICD-10-CM

## 2020-12-14 PROCEDURE — 99213 OFFICE O/P EST LOW 20 MIN: CPT | Performed by: PHYSICIAN ASSISTANT

## 2020-12-14 NOTE — PROGRESS NOTES
Problem list     Subjective   Milly Chase is a 70 y.o. female     Chief Complaint   Patient presents with   • Follow-up     TESTING FU       HPI    Patient is a 70-year-old female that presents back to the office for follow-up.  Patient was initially referred to our office.  In the preoperative setting she was evaluated and apparently had bradycardia with heart rates near 50 bpm.  We performed echocardiogram and event monitor.  Event monitor showed no significant bradycardia arrhythmias.  She had bradycardia during night hours but no other significant bradycardia arrhythmia identified.  She also had echocardiogram demonstrating low normal systolic function of 50%.  No critical valve disease.  Trivial to mild TR but otherwise largely normal.    She feels remarkably well.  She has no chest pain or pressure.  She has no shortness of breath.  No PND orthopnea.  She does not complain of palpitations or dysrhythmic symptoms.  Otherwise is doing well      Current Outpatient Medications on File Prior to Visit   Medication Sig Dispense Refill   • aspirin 81 MG EC tablet Take 81 mg by mouth Daily. Pt states she doesn't take it regularly     • levothyroxine (SYNTHROID, LEVOTHROID) 125 MCG tablet Take 125 mcg by mouth Daily.     • losartan (COZAAR) 25 MG tablet Take 25 mg by mouth Daily.     • OMEPRAZOLE PO Take  by mouth.     • cephalexin (KEFLEX) 500 MG capsule Take 500 mg by mouth 3 (Three) Times a Day.       No current facility-administered medications on file prior to visit.        Patient has no known allergies.    Past Medical History:   Diagnosis Date   • Diabetes (CMS/[facility])    • GERD (gastroesophageal reflux disease)    • Hashimoto's disease    • Hypertension    • Hypothyroidism        Social History     Socioeconomic History   • Marital status:      Spouse name: Not on file   • Number of children: Not on file   • Years of education: Not on file   • Highest education level: Not on file   Tobacco Use   •  "Smoking status: Former Smoker     Packs/day: 0.75     Years: 6.00     Pack years: 4.50     Types: Cigarettes   • Smokeless tobacco: Never Used   Substance and Sexual Activity   • Alcohol use: Not Currently   • Drug use: Never   • Sexual activity: Defer       Family History   Problem Relation Age of Onset   • Breast cancer Paternal Aunt    • Hernia Mother    • Hypertension Mother    • Heart attack Father    • Hypertension Father    • Hyperlipidemia Father        Review of Systems   Constitutional: Negative for chills, fatigue and fever.        Went over medications verbally.   HENT: Negative.  Negative for congestion, rhinorrhea and sore throat.    Eyes: Positive for visual disturbance (reading glasses).   Respiratory: Negative for apnea, chest tightness and shortness of breath.    Cardiovascular: Positive for leg swelling (occasionally in left ankle). Negative for chest pain and palpitations.   Endocrine: Positive for cold intolerance. Negative for heat intolerance.   Musculoskeletal: Positive for arthralgias, back pain and neck pain. Negative for gait problem and neck stiffness.   Skin: Negative.  Negative for rash and wound.   Allergic/Immunologic: Positive for environmental allergies (seasonal allergies). Negative for food allergies.   Neurological: Positive for weakness and numbness (occasionally in hands). Negative for dizziness, light-headedness and headaches.   Hematological: Does not bruise/bleed easily.   Psychiatric/Behavioral: Positive for sleep disturbance (sometimes difficulty falling/staying asleep).       Objective   Vitals:    12/14/20 1514   BP: 149/77   Pulse: 55   SpO2: 96%   Height: 162.6 cm (64.02\")      /77   Pulse 55   Ht 162.6 cm (64.02\")   SpO2 96%   BMI 37.44 kg/m²     Lab Results (most recent)     None          Physical Exam  Vitals signs and nursing note reviewed.   Constitutional:       General: She is not in acute distress.     Appearance: Normal appearance. She is " well-developed.   HENT:      Head: Normocephalic and atraumatic.   Eyes:      General: No scleral icterus.        Right eye: No discharge.         Left eye: No discharge.      Conjunctiva/sclera: Conjunctivae normal.   Neck:      Vascular: No carotid bruit.   Cardiovascular:      Rate and Rhythm: Normal rate and regular rhythm.      Heart sounds: Normal heart sounds. No murmur. No friction rub. No gallop.    Pulmonary:      Effort: Pulmonary effort is normal. No respiratory distress.      Breath sounds: Normal breath sounds. No wheezing or rales.   Chest:      Chest wall: No tenderness.   Musculoskeletal:      Right lower leg: Edema present.      Left lower leg: Edema present.      Comments: Trace and venous stasis bilaterally   Skin:     General: Skin is warm and dry.      Coloration: Skin is not pale.      Findings: No erythema or rash.   Neurological:      Mental Status: She is alert and oriented to person, place, and time.      Cranial Nerves: No cranial nerve deficit.   Psychiatric:         Behavior: Behavior normal.         Procedure   Procedures       Assessment/Plan     Problems Addressed this Visit        Cardiovascular and Mediastinum    Bradycardia, sinus    Nonrheumatic tricuspid valve regurgitation - Primary       Other    Lower extremity edema      Diagnoses       Codes Comments    Nonrheumatic tricuspid valve regurgitation    -  Primary ICD-10-CM: I36.1  ICD-9-CM: 424.2     Bradycardia, sinus     ICD-10-CM: R00.1  ICD-9-CM: 427.89     Lower extremity edema     ICD-10-CM: R60.0  ICD-9-CM: 782.3         Recommendation  1.  Patient doing remarkably well.  Trace lower extremity edema without likely from venous insufficiency.  Very mild and for now nothing further    2.  Bradycardia noted but event monitoring showed no significant bradycardia arrhythmia.  For now we will continue to monitor clinically    3.  Mild TR noted.  For now nothing further we can reassess in a few years.  We will see patient back  for follow-up in 1 year or sooner as symptoms discussed.  Follow-up with primary as scheduled             Milly Chase  reports that she has quit smoking. Her smoking use included cigarettes. She has a 4.50 pack-year smoking history. She has never used smokeless tobacco.        Patient's Body mass index is 37.44 kg/m². BMI is above normal parameters. Recommendations include: educational material.     Advance Care Planning   ACP discussion was declined by the patient. Patient has an advance directive (not in EMR), copy requested.    Electronically signed by:

## 2020-12-23 ENCOUNTER — HOSPITAL ENCOUNTER (OUTPATIENT)
Dept: MAMMOGRAPHY | Facility: HOSPITAL | Age: 70
Discharge: HOME | End: 2020-12-23
Admitting: CLINIC/CENTER

## 2020-12-23 DIAGNOSIS — Z12.31 VISIT FOR SCREENING MAMMOGRAM: ICD-10-CM

## 2020-12-23 PROCEDURE — 77067 SCR MAMMO BI INCL CAD: CPT

## 2020-12-23 PROCEDURE — 77063 BREAST TOMOSYNTHESIS BI: CPT

## 2020-12-28 PROCEDURE — 77067 SCR MAMMO BI INCL CAD: CPT | Performed by: RADIOLOGY

## 2020-12-28 PROCEDURE — 77063 BREAST TOMOSYNTHESIS BI: CPT | Performed by: RADIOLOGY

## 2021-01-20 ENCOUNTER — TELEPHONE (OUTPATIENT)
Dept: CARDIOLOGY | Facility: CLINIC | Age: 71
End: 2021-01-20

## 2021-01-21 NOTE — TELEPHONE ENCOUNTER
Spoke w/ [place] [facility] and requested more detailed cardiac clearance request. Stated they will fax right over. CANDE PATEL.

## 2021-02-17 DIAGNOSIS — Z23 IMMUNIZATION DUE: ICD-10-CM

## 2021-04-13 ENCOUNTER — IMMUNIZATION (OUTPATIENT)
Dept: VACCINE CLINIC | Facility: HOSPITAL | Age: 71
End: 2021-04-13

## 2021-04-13 PROCEDURE — 91300 HC SARSCOV02 VAC 30MCG/0.3ML IM: CPT | Performed by: INTERNAL MEDICINE

## 2021-04-13 PROCEDURE — 0001A: CPT | Performed by: INTERNAL MEDICINE

## 2021-05-04 ENCOUNTER — IMMUNIZATION (OUTPATIENT)
Dept: VACCINE CLINIC | Facility: HOSPITAL | Age: 71
End: 2021-05-04

## 2021-05-04 PROCEDURE — 91300 HC SARSCOV02 VAC 30MCG/0.3ML IM: CPT | Performed by: INTERNAL MEDICINE

## 2021-05-04 PROCEDURE — 0002A: CPT | Performed by: INTERNAL MEDICINE

## 2022-02-23 ENCOUNTER — HOSPITAL ENCOUNTER (OUTPATIENT)
Dept: MAMMOGRAPHY | Facility: HOSPITAL | Age: 72
Discharge: HOME | End: 2022-02-23
Admitting: NURSE PRACTITIONER

## 2022-02-23 DIAGNOSIS — Z12.31 VISIT FOR SCREENING MAMMOGRAM: ICD-10-CM

## 2022-02-23 PROCEDURE — 77067 SCR MAMMO BI INCL CAD: CPT

## 2022-02-23 PROCEDURE — 77067 SCR MAMMO BI INCL CAD: CPT | Performed by: RADIOLOGY

## 2022-02-23 PROCEDURE — 77063 BREAST TOMOSYNTHESIS BI: CPT

## 2022-02-23 PROCEDURE — 77063 BREAST TOMOSYNTHESIS BI: CPT | Performed by: RADIOLOGY

## 2022-03-30 ENCOUNTER — HOSPITAL ENCOUNTER (OUTPATIENT)
Dept: BONE DENSITY | Facility: HOSPITAL | Age: 72
Discharge: HOME | End: 2022-03-30
Admitting: NURSE PRACTITIONER

## 2022-03-30 DIAGNOSIS — Z78.0 POSTMENOPAUSAL STATUS: ICD-10-CM

## 2022-03-30 PROCEDURE — 77080 DXA BONE DENSITY AXIAL: CPT

## 2022-03-30 PROCEDURE — 77080 DXA BONE DENSITY AXIAL: CPT | Performed by: RADIOLOGY

## 2023-02-24 ENCOUNTER — HOSPITAL ENCOUNTER (OUTPATIENT)
Dept: MAMMOGRAPHY | Facility: HOSPITAL | Age: 73
Discharge: HOME | End: 2023-02-24
Admitting: NURSE PRACTITIONER
Payer: MEDICARE

## 2023-02-24 DIAGNOSIS — Z12.31 VISIT FOR SCREENING MAMMOGRAM: ICD-10-CM

## 2023-02-24 PROCEDURE — 77067 SCR MAMMO BI INCL CAD: CPT

## 2023-02-24 PROCEDURE — 77063 BREAST TOMOSYNTHESIS BI: CPT

## 2023-02-24 PROCEDURE — 77067 SCR MAMMO BI INCL CAD: CPT | Performed by: RADIOLOGY

## 2023-02-24 PROCEDURE — 77063 BREAST TOMOSYNTHESIS BI: CPT | Performed by: RADIOLOGY

## 2024-01-16 ENCOUNTER — TRANSCRIBE ORDERS (OUTPATIENT)
Dept: ADMINISTRATIVE | Facility: HOSPITAL | Age: 74
End: 2024-01-16
Payer: MEDICARE

## 2024-01-16 DIAGNOSIS — Z12.31 SCREENING MAMMOGRAM FOR BREAST CANCER: Primary | ICD-10-CM

## 2024-09-23 ENCOUNTER — TRANSCRIBE ORDERS (OUTPATIENT)
Dept: ADMINISTRATIVE | Facility: HOSPITAL | Age: 74
End: 2024-09-23
Payer: MEDICARE

## 2024-09-23 DIAGNOSIS — N18.31 STAGE 3A CHRONIC KIDNEY DISEASE: Primary | ICD-10-CM

## 2024-11-06 ENCOUNTER — HOSPITAL ENCOUNTER (OUTPATIENT)
Dept: ULTRASOUND IMAGING | Facility: HOSPITAL | Age: 74
Discharge: HOME | End: 2024-11-06
Admitting: INTERNAL MEDICINE
Payer: MEDICARE

## 2024-11-06 DIAGNOSIS — N18.31 STAGE 3A CHRONIC KIDNEY DISEASE: ICD-10-CM

## 2024-11-06 PROCEDURE — 76775 US EXAM ABDO BACK WALL LIM: CPT

## 2024-11-06 PROCEDURE — 76775 US EXAM ABDO BACK WALL LIM: CPT | Performed by: RADIOLOGY

## 2025-08-01 ENCOUNTER — TELEPHONE (OUTPATIENT)
Dept: CARDIOLOGY | Facility: CLINIC | Age: 75
End: 2025-08-01
Payer: MEDICARE

## 2025-08-01 ENCOUNTER — OFFICE VISIT (OUTPATIENT)
Dept: CARDIOLOGY | Facility: CLINIC | Age: 75
End: 2025-08-01
Payer: MEDICARE

## 2025-08-01 VITALS
WEIGHT: 189.4 LBS | SYSTOLIC BLOOD PRESSURE: 143 MMHG | HEART RATE: 58 BPM | BODY MASS INDEX: 33.56 KG/M2 | DIASTOLIC BLOOD PRESSURE: 79 MMHG | HEIGHT: 63 IN | OXYGEN SATURATION: 94 %

## 2025-08-01 DIAGNOSIS — R06.02 SHORTNESS OF BREATH: ICD-10-CM

## 2025-08-01 DIAGNOSIS — I36.1 NONRHEUMATIC TRICUSPID VALVE REGURGITATION: ICD-10-CM

## 2025-08-01 DIAGNOSIS — R73.03 PREDIABETES: ICD-10-CM

## 2025-08-01 DIAGNOSIS — I10 ESSENTIAL HYPERTENSION: ICD-10-CM

## 2025-08-01 DIAGNOSIS — F17.211 CIGARETTE NICOTINE DEPENDENCE IN REMISSION: ICD-10-CM

## 2025-08-01 DIAGNOSIS — R07.2 PRECORDIAL CHEST PAIN: Primary | ICD-10-CM

## 2025-08-01 PROCEDURE — 3078F DIAST BP <80 MM HG: CPT | Performed by: SPECIALIST

## 2025-08-01 PROCEDURE — 3077F SYST BP >= 140 MM HG: CPT | Performed by: SPECIALIST

## 2025-08-01 PROCEDURE — 99204 OFFICE O/P NEW MOD 45 MIN: CPT | Performed by: SPECIALIST

## 2025-08-01 RX ORDER — TIRZEPATIDE 2.5 MG/.5ML
0.5 INJECTION, SOLUTION SUBCUTANEOUS WEEKLY
COMMUNITY

## 2025-08-01 NOTE — TELEPHONE ENCOUNTER
Pt would like to have testing done in [place] since it's closer to home. Pt also requested to see Dr. Corado in [place] for her appt's

## 2025-08-01 NOTE — PROGRESS NOTES
Subjective   Initial consultation, chest pain  Milly Chase is a 74 y.o. female who presents to day for Chest Pain (Patient was having twinges felt like mini shocks after starting Aspirin. She stopped the Aspirin and her symptoms went away).    CHIEF COMPLIANT  Chief Complaint   Patient presents with    Chest Pain     Patient was having twinges felt like mini shocks after starting Aspirin. She stopped the Aspirin and her symptoms went away       Active Problems:  Problem List Items Addressed This Visit          Cardiac and Vasculature    Nonrheumatic tricuspid valve regurgitation    Relevant Medications    amLODIPine 5 MG tablet 5 mg, losartan 50 MG tablet 100 mg    Precordial chest pain - Primary    Relevant Medications    amLODIPine 5 MG tablet 5 mg, losartan 50 MG tablet 100 mg    Other Relevant Orders    Lipid Panel    Comprehensive Metabolic Panel    Adult Transthoracic Echo Complete W/ Cont if Necessary Per Protocol    Essential hypertension    Relevant Medications    amLODIPine 5 MG tablet 5 mg, losartan 50 MG tablet 100 mg       Endocrine and Metabolic    Prediabetes    Relevant Medications    amLODIPine 5 MG tablet 5 mg, losartan 50 MG tablet 100 mg    Tirzepatide (Mounjaro) 2.5 MG/0.5ML solution auto-injector       Pulmonary and Pneumonias    Shortness of breath    Relevant Orders    Lipid Panel    Comprehensive Metabolic Panel    Adult Transthoracic Echo Complete W/ Cont if Necessary Per Protocol       Tobacco    Cigarette nicotine dependence in remission       HPI  HPI    History of Present Illness  The patient presents for evaluation of chest pain, hypertension, and borderline diabetes.    Two weeks ago, her doctor prescribed an 81 mg aspirin regimen. She had previously tried aspirin many years ago without success and believes it may be causing her current symptoms. She describes a sensation in her heart akin to a brief shock lasting about a second. This occurred daily for a week while she was on the  aspirin, but not constantly. An EKG performed by her doctor showed normal results. Despite her suspicion that the aspirin was causing her symptoms, her doctor advised her to continue the medication. She then tried taking the aspirin every other day for a week, but the symptoms persisted on the days she took it. She stopped taking the aspirin last Friday (07/25/2025) and has not experienced any further issues. She is unsure why she was prescribed aspirin initially.    She reports no history of heart disease. Approximately 6 to 7 years ago, Dr. Ashby identified a minor leak in the back of her heart, which he did not consider significant. She maintains an active lifestyle, including walking and working in a cafeteria. She experiences shortness of breath when walking uphill, but it is not severe. She occasionally notices swelling in her legs, but it is not a major concern. She does not experience chest discomfort or tightness during physical activity. She also reports no palpitations or racing heart. She does not have high cholesterol and is on medication for high blood pressure, which is well-controlled. She is borderline diabetic and is on Mounjaro, which she believes she could discontinue. She has been borderline diabetic for over a decade. She describes her pain as brief and sharp, lasting about a second, and does not believe it is triggered by spicy food, heavy lifting, or walking.    She monitors her blood pressure at home almost daily, with readings typically around 119/78 or 120/80. She recently switched from taking her medication in the evening to early morning due to her work schedule.    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  Exercise: Walks regularly.  Tobacco: Smoked cigarettes about 40 years ago for approximately 10 years, averaging 1 pack per day.       PRIOR MEDS  Current Outpatient Medications on File Prior to Visit   Medication Sig Dispense  Refill    amLODIPine 5 MG tablet 5 mg, losartan 50 MG tablet 100 mg Take  by mouth Daily.      levothyroxine (SYNTHROID, LEVOTHROID) 88 MCG tablet Take 1 tablet by mouth Daily.      OMEPRAZOLE PO Take 20 mg by mouth Daily.      Tirzepatide (Mounjaro) 2.5 MG/0.5ML solution auto-injector Inject 0.5 mg under the skin into the appropriate area as directed 1 (One) Time Per Week.      cephalexin (KEFLEX) 500 MG capsule Take 1 capsule by mouth 3 (Three) Times a Day.      losartan (COZAAR) 25 MG tablet Take 1 tablet by mouth Daily.      [DISCONTINUED] aspirin 81 MG EC tablet Take 81 mg by mouth Daily. Pt states she doesn't take it regularly       No current facility-administered medications on file prior to visit.       ALLERGIES  Aspirin and Lisinopril    HISTORY  Past Medical History:   Diagnosis Date    Diabetes     GERD (gastroesophageal reflux disease)     Hashimoto's disease     Hypertension     Hypothyroidism        Social History     Socioeconomic History    Marital status:    Tobacco Use    Smoking status: Former     Current packs/day: 0.75     Average packs/day: 0.8 packs/day for 6.0 years (4.5 ttl pk-yrs)     Types: Cigarettes    Smokeless tobacco: Never   Vaping Use    Vaping status: Never Used   Substance and Sexual Activity    Alcohol use: Not Currently    Drug use: Never    Sexual activity: Defer       Family History   Problem Relation Age of Onset    Breast cancer Paternal Aunt         60's    Hernia Mother     Hypertension Mother     Heart attack Father     Hypertension Father     Hyperlipidemia Father        Review of Systems   Constitutional:  Negative for fatigue.   Respiratory:  Negative for apnea, chest tightness and shortness of breath.    Cardiovascular:  Positive for chest pain (resolved was having twinges) and leg swelling (mild feet and ankles). Negative for palpitations.   Gastrointestinal:  Positive for constipation. Negative for diarrhea and nausea.   Neurological:  Positive for  "dizziness (rare getting up and down bending over). Negative for syncope, weakness and light-headedness.   Hematological:  Does not bruise/bleed easily.   Psychiatric/Behavioral:  Negative for sleep disturbance.        Objective     VITALS: /79 (BP Location: Left arm, Patient Position: Sitting, Cuff Size: Adult)   Pulse 58   Ht 160 cm (63\")   Wt 85.9 kg (189 lb 6.4 oz)   SpO2 94%   BMI 33.55 kg/m²     LABS:   Lab Results (most recent)       None            IMAGING:   No Images in the past 120 days found..    EXAM:  Physical Exam  Vitals reviewed.   Constitutional:       Appearance: She is well-developed.   HENT:      Head: Normocephalic and atraumatic.   Eyes:      Pupils: Pupils are equal, round, and reactive to light.   Neck:      Thyroid: No thyromegaly.      Vascular: No JVD.   Cardiovascular:      Rate and Rhythm: Normal rate and regular rhythm.      Heart sounds: Normal heart sounds. No murmur heard.     No friction rub. No gallop.   Pulmonary:      Effort: Pulmonary effort is normal. No respiratory distress.      Breath sounds: Normal breath sounds. No stridor. No wheezing or rales.   Chest:      Chest wall: No tenderness.   Musculoskeletal:         General: No tenderness or deformity.      Cervical back: Neck supple.   Skin:     General: Skin is warm and dry.   Neurological:      Mental Status: She is alert and oriented to person, place, and time.      Cranial Nerves: No cranial nerve deficit.      Coordination: Coordination normal.       Physical Exam  Blood Pressure: 143/79  Heart: Regular rate and rhythm, no murmurs or abnormal sounds noted.  Lungs: Clear to auscultation bilaterally, no wheezes, rales, or rhonchi.       Procedure   Procedures      Results  Diagnostic Testing   - EKG: Normal       Assessment & Plan     Diagnoses and all orders for this visit:    1. Precordial chest pain (Primary)  -     Lipid Panel; Future  -     Comprehensive Metabolic Panel; Future  -     Adult Transthoracic " Echo Complete W/ Cont if Necessary Per Protocol; Future    2. Shortness of breath  -     Lipid Panel; Future  -     Comprehensive Metabolic Panel; Future  -     Adult Transthoracic Echo Complete W/ Cont if Necessary Per Protocol; Future    3. Prediabetes    4. Nonrheumatic tricuspid valve regurgitation    5. Cigarette nicotine dependence in remission    6. Essential hypertension      Assessment & Plan  1. Chest pain:  - The chest pain is atypical for cardiac origin, being very brief ( about one second ),non exertional and sharp, lasting about a second.  - Pain started after beginning aspirin therapy and resolved after discontinuing it.  - An echocardiogram will be ordered to evaluate heart valve function due to a small leakage noted years ago.  - A cholesterol panel will be conducted.  - If chest pain recurs, inform the office immediately, and a stress test will be considered.    2. Hypertension:  - Blood pressure was slightly elevated today at 143/79 mmHg.  - Home readings are typically around 119/78 mmHg or 120/80 mmHg.  - Medication timing adjusted to early morning due to work schedule.  - Blood pressure will be monitored closely.  - Adjustments to medication regimen will be made as necessary.    3. Borderline diabetes:  - Borderline diabetic for over 10 years.  - Currently on Mounjaro, which is also helping with weight management.    4. Shortness of breath:  - Will get echocardiogram to assess cardiac function, wall motion and valve morphology.    5. Cardiac risk factors:  - Will get lipid profile, CMP, A1c for further assessment.    Follow-up: The patient will follow up in 1 month.       Return in about 4 weeks (around 8/29/2025).      Advance Care Planning   ACP discussion was held with the patient during this visit. Patient has an advance directive (not in EMR), copy requested.             MEDS ORDERED DURING VISIT:  No orders of the defined types were placed in this encounter.        As always, Toshia  LEONARD Terry  I appreciate very much the opportunity to participate in the cardiovascular care of your patients. Please do not hesitate to call me with any questions with regards to Milly Chase evaluation and management.     Patient or patient representative verbalized consent for the use of Ambient Listening during the visit with  Amairani Corado MD for chart documentation. 8/1/2025  12:45 EDT       This document has been electronically signed by Amairani Corado MD  August 1, 2025 13:03 EDT    This note is dictated utilizing voice recognition software.